# Patient Record
Sex: MALE | Race: WHITE | URBAN - METROPOLITAN AREA
[De-identification: names, ages, dates, MRNs, and addresses within clinical notes are randomized per-mention and may not be internally consistent; named-entity substitution may affect disease eponyms.]

---

## 2023-05-19 ENCOUNTER — OFFICE VISIT (OUTPATIENT)
Dept: URBAN - METROPOLITAN AREA CLINIC 71 | Facility: CLINIC | Age: 68
End: 2023-05-19
Payer: COMMERCIAL

## 2023-05-19 DIAGNOSIS — H25.813 COMBINED FORMS OF AGE-RELATED CATARACT, BILATERAL: Primary | ICD-10-CM

## 2023-05-19 DIAGNOSIS — H52.4 PRESBYOPIA: ICD-10-CM

## 2023-05-19 DIAGNOSIS — H04.123 DRY EYE SYNDROME OF BILATERAL LACRIMAL GLANDS: ICD-10-CM

## 2023-05-19 PROCEDURE — 99203 OFFICE O/P NEW LOW 30 MIN: CPT

## 2023-05-19 ASSESSMENT — VISUAL ACUITY
OS: 20/20
OD: 20/20

## 2023-05-19 ASSESSMENT — INTRAOCULAR PRESSURE
OD: 16
OS: 13

## 2023-05-19 NOTE — IMPRESSION/PLAN
Impression: Presbyopia: H52.4. Plan: Issued new spec Rx, discussed changes and possible adaptation period. Printed NVO & PAL/BF Rx. Cut back on reading Rx because patient is currently in +1.75 OTC readers and the big change may cause adaptability issues. - Discussed this with patient.  

RTC 1yr or prn

## 2023-05-19 NOTE — IMPRESSION/PLAN
Impression: Dry eye syndrome of bilateral lacrimal glands: H04.123.  Plan: Encouraged use OTC ATs systane or refresh BID-TID OU

## 2023-05-19 NOTE — IMPRESSION/PLAN
Impression: Combined forms of age-related cataract, bilateral: H25.813. Plan: Not affecting ADL's at this time. Not surgically significant at this time. Discussed progressive nature of cataracts with patient and associated symptoms (hazy/foggy vision that is constant, increased glare, difficulty night time driving). Cont to monitor.

## 2024-10-16 ENCOUNTER — OFFICE VISIT (OUTPATIENT)
Dept: URBAN - METROPOLITAN AREA CLINIC 71 | Facility: CLINIC | Age: 69
End: 2024-10-16
Payer: COMMERCIAL

## 2024-10-16 DIAGNOSIS — H52.4 PRESBYOPIA: Primary | ICD-10-CM

## 2024-10-16 DIAGNOSIS — H25.813 COMBINED FORMS OF AGE-RELATED CATARACT, BILATERAL: ICD-10-CM

## 2024-10-16 PROCEDURE — 99213 OFFICE O/P EST LOW 20 MIN: CPT | Performed by: OPTOMETRIST

## 2024-10-16 ASSESSMENT — VISUAL ACUITY
OS: 20/20
OD: 20/20

## 2024-10-16 ASSESSMENT — INTRAOCULAR PRESSURE
OS: 18
OD: 17